# Patient Record
Sex: FEMALE | Race: BLACK OR AFRICAN AMERICAN
[De-identification: names, ages, dates, MRNs, and addresses within clinical notes are randomized per-mention and may not be internally consistent; named-entity substitution may affect disease eponyms.]

---

## 2017-04-07 ENCOUNTER — HOSPITAL ENCOUNTER (EMERGENCY)
Dept: HOSPITAL 63 - ER | Age: 42
Discharge: HOME | End: 2017-04-07
Payer: SELF-PAY

## 2017-04-07 VITALS — HEIGHT: 69 IN | WEIGHT: 188 LBS | BODY MASS INDEX: 27.85 KG/M2

## 2017-04-07 VITALS — SYSTOLIC BLOOD PRESSURE: 146 MMHG | DIASTOLIC BLOOD PRESSURE: 106 MMHG

## 2017-04-07 DIAGNOSIS — Z88.1: ICD-10-CM

## 2017-04-07 DIAGNOSIS — F41.9: ICD-10-CM

## 2017-04-07 DIAGNOSIS — E87.6: ICD-10-CM

## 2017-04-07 DIAGNOSIS — D64.9: ICD-10-CM

## 2017-04-07 DIAGNOSIS — D72.820: ICD-10-CM

## 2017-04-07 DIAGNOSIS — R42: Primary | ICD-10-CM

## 2017-04-07 DIAGNOSIS — R79.1: ICD-10-CM

## 2017-04-07 DIAGNOSIS — E86.0: ICD-10-CM

## 2017-04-07 LAB
ALBUMIN SERPL-MCNC: 3.8 G/DL (ref 3.4–5)
ALBUMIN/GLOB SERPL: 1.2 {RATIO} (ref 1–1.7)
ALP SERPL-CCNC: 42 U/L (ref 46–116)
ALT SERPL-CCNC: 25 U/L (ref 14–59)
AMORPH SED URNS QL MICRO: PRESENT /HPF
ANION GAP SERPL CALC-SCNC: 11 MMOL/L (ref 6–14)
APTT PPP: YELLOW S
AST SERPL-CCNC: 15 U/L (ref 15–37)
BACTERIA #/AREA URNS HPF: (no result) /HPF
BASOPHILS # BLD AUTO: 0 X10^3/UL (ref 0–0.2)
BASOPHILS NFR BLD: 1 % (ref 0–3)
BILIRUB SERPL-MCNC: 2.2 MG/DL (ref 0.2–1)
BILIRUB UR QL STRIP: (no result)
BUN/CREAT SERPL: 14 (ref 6–20)
CA-I SERPL ISE-MCNC: 11 MG/DL (ref 7–20)
CALCIUM SERPL-MCNC: 8.3 MG/DL (ref 8.5–10.1)
CHLORIDE SERPL-SCNC: 104 MMOL/L (ref 98–107)
CO2 SERPL-SCNC: 27 MMOL/L (ref 21–32)
CREAT SERPL-MCNC: 0.8 MG/DL (ref 0.6–1)
EOSINOPHIL NFR BLD: 0.1 X10^3/UL (ref 0–0.7)
EOSINOPHIL NFR BLD: 3 % (ref 0–3)
ERYTHROCYTE [DISTWIDTH] IN BLOOD BY AUTOMATED COUNT: 14.4 % (ref 11.5–14.5)
FIBRINOGEN PPP-MCNC: CLEAR MG/DL
GFR SERPLBLD BASED ON 1.73 SQ M-ARVRAT: 77.9 ML/MIN
GLOBULIN SER-MCNC: 3.3 G/DL (ref 2.2–3.8)
GLUCOSE SERPL-MCNC: 96 MG/DL (ref 70–99)
GLUCOSE UR STRIP-MCNC: (no result) MG/DL
HCT VFR BLD CALC: 35.2 % (ref 36–47)
HGB BLD-MCNC: 11.6 G/DL (ref 12–15.5)
LYMPHOCYTES # BLD: 1.9 X10^3/UL (ref 1–4.8)
LYMPHOCYTES NFR BLD AUTO: 52 % (ref 24–48)
MCH RBC QN AUTO: 27 PG (ref 25–35)
MCHC RBC AUTO-ENTMCNC: 33 G/DL (ref 31–37)
MCV RBC AUTO: 82 FL (ref 79–100)
MONO #: 0.3 X10^3/UL (ref 0–1.1)
MONOCYTES NFR BLD: 8 % (ref 0–9)
NEUT #: 1.3 X10^3UL (ref 1.8–7.7)
NEUTROPHILS NFR BLD AUTO: 36 % (ref 31–73)
NITRITE UR QL STRIP: (no result)
PLATELET # BLD AUTO: 218 X10^3/UL (ref 140–400)
POTASSIUM SERPL-SCNC: 3.1 MMOL/L (ref 3.5–5.1)
PROT SERPL-MCNC: 7.1 G/DL (ref 6.4–8.2)
RBC # BLD AUTO: 4.3 X10^6/UL (ref 3.5–5.4)
RBC #/AREA URNS HPF: (no result) /HPF (ref 0–2)
SODIUM SERPL-SCNC: 142 MMOL/L (ref 136–145)
SP GR UR STRIP: 1.02
SQUAMOUS #/AREA URNS LPF: (no result) /LPF
UROBILINOGEN UR-MCNC: 1 MG/DL
WBC # BLD AUTO: 3.8 X10^3/UL (ref 4–11)
WBC #/AREA URNS HPF: (no result) /HPF (ref 0–4)

## 2017-04-07 PROCEDURE — 71275 CT ANGIOGRAPHY CHEST: CPT

## 2017-04-07 PROCEDURE — 85379 FIBRIN DEGRADATION QUANT: CPT

## 2017-04-07 PROCEDURE — 36415 COLL VENOUS BLD VENIPUNCTURE: CPT

## 2017-04-07 PROCEDURE — 85730 THROMBOPLASTIN TIME PARTIAL: CPT

## 2017-04-07 PROCEDURE — 99285 EMERGENCY DEPT VISIT HI MDM: CPT

## 2017-04-07 PROCEDURE — 93005 ELECTROCARDIOGRAM TRACING: CPT

## 2017-04-07 PROCEDURE — 96360 HYDRATION IV INFUSION INIT: CPT

## 2017-04-07 PROCEDURE — 84484 ASSAY OF TROPONIN QUANT: CPT

## 2017-04-07 PROCEDURE — 81001 URINALYSIS AUTO W/SCOPE: CPT

## 2017-04-07 PROCEDURE — 71010: CPT

## 2017-04-07 PROCEDURE — 96372 THER/PROPH/DIAG INJ SC/IM: CPT

## 2017-04-07 PROCEDURE — 85610 PROTHROMBIN TIME: CPT

## 2017-04-07 PROCEDURE — 85027 COMPLETE CBC AUTOMATED: CPT

## 2017-04-07 PROCEDURE — 80053 COMPREHEN METABOLIC PANEL: CPT

## 2017-04-07 NOTE — RAD
PROCEDURE 

CTA chest with contrast 

HISTORY 

Dizziness dyspnea and elevated D-dimer 2 

TECHNIQUE 

Axial helical images of the chest obtained after administration of 75 cc 

IV Omni 300 contrast. Timing is appropriate for pulmonary artery 

evaluation. In addition thin cut axial images coronal and sagittal maximum

intensity 3D projected imaging was performed.. 

FINDINGS 

The pulmonary vessels are well opacified without filling defects. The 

thoracic aorta appears normal. There is no lymphadenopathy. 

The lungs are clear. 

IMPRESSION 

No acute findings. 

No evidence of pulmonary embolism. 

Normal thoracic aorta. 

PQRS Statement: 

One or more of the following individualized dose reduction techniques were

utilized for this study: 

1. Automated exposure control. 

2. Adjustment of the mA and/or kV according to patient size. 

3. Use of iterative reconstruction technique. 

 

 

Electronically signed by: Aleksey Houston MD (Apr 07, 2017 06:24:49)

## 2017-04-07 NOTE — ED.ADGEN
Adult General


Chief Complaint


Chief Complaint


" I woke up feeling really dizzy in the bed....".."  I do get anxiety..." but 

... I have not been dizzy for years...."  " I have felt like I am coming down 

with a bug.. for flu.. randall..."





HPI


HPI





Patient is a 42 year old female who presents with above history and complaints.

  Patient denies any travel or ill contacts.  Patient is normally healthy. 

Patient does have a past history of anemia. Patient has a history of anxiety 

disorder.  No history of cardiac disorders. No history of DVTs or pulmonary 

embolisms. No history of TIAs or CVAs.





Review of Systems


Review of Systems





Constitutional: Denies fever or chills []


Eyes: Denies change in visual acuity, redness, or eye pain []


HENT: Denies nasal congestion or sore throat []


Respiratory: Denies cough or shortness of breath []


Cardiovascular: No additional information not addressed in HPI []


GI: Denies abdominal pain, nausea, vomiting, bloody stools or diarrhea []


: Denies dysuria or hematuria []


Musculoskeletal: Denies back pain or joint pain []


Integument: Denies rash or skin lesions []


Neurologic: Denies headache, focal weakness or sensory changes [] complaints of 

dizziness


Endocrine: Denies polyuria or polydipsia []





Family History


Family History


Noncontributory





Current Medications


Current Medications





 Current Medications








 Medications


  (Trade)  Dose


 Ordered  Sig/Scarlett  Start Time


 Stop Time Status Last Admin


Dose Admin


 


 Aspirin


  (Adam Aspirin)  325 mg  1X  ONCE  4/7/17 06:00


 4/7/17 06:01 DC 4/7/17 06:00


325 MG


 


 Enoxaparin Sodium


  (Lovenox 100mg


 Syringe)  90 mg  1X  ONCE  4/7/17 06:00


 4/7/17 06:01 DC 4/7/17 06:00


90 MG


 


 Info


  (Do NOT chart on


 this entry -- for


 MONITORING)  1 each  PRN DAILY  PRN  4/7/17 06:00


 4/9/17 05:59   


 


 


 Iohexol


  (Omnipaque 300


 Mg/ml)  75 ml  1X  ONCE  4/7/17 06:00


 4/7/17 06:01 DC 4/7/17 06:05


75 ML


 


 Lactated Ringer's


  (Iv Lactated


 Ringers)  1,000 ml @ 


 1,000 mls/hr  1X  ONCE  4/7/17 05:30


 4/7/17 06:29 DC 4/7/17 05:24


1,000 MLS/HR


 


 Potassium Chloride


  (KCl Oral Soln)  40 meq  1X  ONCE  4/7/17 06:00


 4/7/17 06:01 DC 4/7/17 06:00


40 MEQ





See nursing for home meds





Allergies


Allergies





 Allergies








Coded Allergies Type Severity Reaction Last Updated Verified


 


  ceftriaxone Allergy Unknown  4/7/17 Yes











Physical Exam


Physical Exam





Constitutional: Well developed, well nourished, no acute distress, non-toxic 

appearance. []


HENT: Normocephalic, atraumatic, bilateral external ears normal, oropharynx 

moist, no oral exudates, nose normal. []


Eyes: PERRLA, EOMI, conjunctiva pale, no discharge. [] 


Neck: Normal range of motion, no tenderness, supple, no stridor. [] 


Cardiovascular:Heart rate regular rhythm, no murmur []


Lungs & Thorax:  Bilateral breath sounds equal at apexes on auscultation []


Abdomen: Bowel sounds normal, soft, no tenderness, no masses, no pulsatile 

masses. [] 


Skin: Warm, dry, no erythema, no rash. [] 


Back: No tenderness, no CVA tenderness. [] 


Extremities: No tenderness, no cyanosis, no clubbing, ROM intact, no edema. No 

cording in legs or edema.


Neurologic: Alert and oriented X 3, normal motor function, normal sensory 

function, no focal deficits noted. [] DTRs are +2 at patella and brachial. No 

drift.  equal. No Romberg.


Psychologic: Affect anxious, judgement normal, mood normal. []





Current Patient Data


Vital Signs





 Vital Signs








  Date Time  Temp Pulse Resp B/P Pulse Ox O2 Delivery O2 Flow Rate FiO2


 


4/7/17 04:18 97.6 64 16  100 Room Air  








Lab Results





 Laboratory Tests








Test


  4/7/17


04:55 4/7/17


05:03


 


White Blood Count


  3.8x10^3/uL


(4.0-11.0)  L 


 


 


Red Blood Count


  4.30x10^6/uL


(3.50-5.40) 


 


 


Hemoglobin


  11.6g/dL


(12.0-15.5)  L 


 


 


Hematocrit


  35.2%


(36.0-47.0)  L 


 


 


Mean Corpuscular Volume 82fL ()   


 


Mean Corpuscular Hemoglobin 27pg (25-35)   


 


Mean Corpuscular Hemoglobin


Concent 33g/dL (31-37)


  


 


 


Red Cell Distribution Width


  14.4%


(11.5-14.5) 


 


 


Platelet Count


  218x10^3/uL


(140-400) 


 


 


Neutrophils (%) (Auto) 36% (31-73)   


 


Lymphocytes (%) (Auto) 52% (24-48)  H 


 


Monocytes (%) (Auto) 8% (0-9)   


 


Eosinophils (%) (Auto) 3% (0-3)   


 


Basophils (%) (Auto) 1% (0-3)   


 


Neutrophils # (Auto)


  1.3x10^3uL


(1.8-7.7)  L 


 


 


Lymphocytes # (Auto)


  1.9x10^3/uL


(1.0-4.8) 


 


 


Monocytes # (Auto)


  0.3x10^3/uL


(0.0-1.1) 


 


 


Eosinophils # (Auto)


  0.1x10^3/uL


(0.0-0.7) 


 


 


Basophils # (Auto)


  0.0x10^3/uL


(0.0-0.2) 


 


 


Prothrombin Time


  10.8SEC


(9.4-11.4) 


 


 


Prothrombin Time INR 1.1 (0.9-1.1)   


 


PTT 23SEC (23-33)   


 


D-Dimer (Keysha)


  0.98mg/L


(0.00-0.50)  H 


 


 


Sodium Level


  142mmol/L


(136-145) 


 


 


Potassium Level


  3.1mmol/L


(3.5-5.1)  L 


 


 


Chloride Level


  104mmol/L


() 


 


 


Carbon Dioxide Level


  27mmol/L


(21-32) 


 


 


Anion Gap 11 (6-14)   


 


Blood Urea Nitrogen


  11mg/dL (7-20)


  


 


 


Creatinine


  0.8mg/dL


(0.6-1.0) 


 


 


Estimated GFR


(Cockcroft-Gault) 77.9  


  


 


 


BUN/Creatinine Ratio 14 (6-20)   


 


Glucose Level


  96mg/dL


(70-99) 


 


 


Calcium Level


  8.3mg/dL


(8.5-10.1)  L 


 


 


Total Bilirubin


  2.2mg/dL


(0.2-1.0)  H 


 


 


Aspartate Amino Transferase


(AST) 15U/L (15-37)  


  


 


 


Alanine Aminotransferase (ALT) 25U/L (14-59)   


 


Alkaline Phosphatase


  42U/L ()


L 


 


 


Troponin I Quantitative


  < 0.017ng/mL


(0-0.055) 


 


 


Total Protein


  7.1g/dL


(6.4-8.2) 


 


 


Albumin


  3.8g/dL


(3.4-5.0) 


 


 


Albumin/Globulin Ratio 1.2 (1.0-1.7)   


 


Urine Collection Type  Unknown  


 


Urine Color  Yellow  


 


Urine Clarity  Clear  


 


Urine pH  7.5  


 


Urine Specific Gravity  1.020  


 


Urine Protein


  


  Neg


(NEG-TRACE)


 


Urine Glucose (UA)


  


  Negmg/dL (NEG)


 


 


Urine Ketones (Stick)


  


  Negmg/dL (NEG)


 


 


Urine Blood  Mod (NEG)  


 


Urine Nitrite  Neg (NEG)  


 


Urine Bilirubin  Neg (NEG)  


 


Urine Urobilinogen Dipstick


  


  1mg/dL (0.2


mg/dL)


 


Urine Leukocyte Esterase  Neg (NEG)  


 


Urine RBC  3-5/HPF (0-2)  


 


Urine WBC


  


  Rare/HPF (0-4)


 


 


Urine Squamous Epithelial


Cells 


  Few/LPF  


 


 


Urine Amorphous Sediment  Present/HPF  


 


Urine Bacteria


  


  Few/HPF


(0-FEW)











EKG


EKG


My interpretation EKG shows a sinus rhythm at 64. No findings acute STEMI. Some 

nonspecific left axis changes. Does have an occasional premature atrial 

contraction. []





Radiology/Procedures


Radiology/Procedures


My interpretation chest x-ray shows no acute cardiopulmonary findings. My 

interpretation of CT chest shows no findings of  pulmonary embolism. []





Course & Med Decision Making


Course & Med Decision Making


Pertinent Labs and Imaging studies reviewed. (See chart for details).  Reviewed 

findings with patient. Patient declined admission at this time. Will follow-up 

primary care. Push fluids. Push vitamin C drinks. Push fruit juices. Follow-up 

anemia with primary care. Follow-up elevated lymphocyte count with primary 

care. Return if any concerns. Take a daily aspirin.





[]





Final Impression


Final Impression


1. Dizzy[]


2. Hypokalemia


3. Dehydration


4. Anemia normocytic


5. Elevated lymphocyte count


6. Elevation in d-dimer


7. History of anxiety


Problems:  





Dragon Disclaimer


Dragon Disclaimer


This electronic medical record was generated, in whole or in part, using a 

voice recognition dictation system.








NORBERT SHANNON MD Apr 7, 2017 05:17

## 2017-04-07 NOTE — RAD
Exam:  AP portable chest. 



History:  Dizziness.



Comparison:  4/20/2011.



Findings:  The heart and mediastinal structures are within normal limits for

size.  Lungs are without infiltrate.  No pneumothorax or pleural effusion is

appreciated.     



Impression:  

1.  No acute cardiopulmonary process.

## 2017-04-07 NOTE — EKG
[facility] [address], [place] [address]

Test Date:    2017               Test Time:    04:36:08

Pat Name:     WOODY BASURTO              Department:   

Patient ID:   [id number]           Room:          

Gender:       F                        Technician:   JORGE

:          1975               Requested By: NORBERT SHANNON

Order Number: [id number]            Reading MD:     

                                 Measurements

Intervals                              Axis          

Rate:         64                       P:            43

WI:           144                      QRS:          0

QRSD:         82                       T:            -7

QT:           436                                    

QTc:          454                                    

                           Interpretive Statements

SINUS RHYTHM

INTERPOLATED ATRIAL PREMATURE COMPLEX(ES)

LEFTWARD AXIS

OTHERWISE NORMAL ECG

RI6.01          Unconfirmed report

No previous ECG available for comparison

## 2018-12-14 ENCOUNTER — HOSPITAL ENCOUNTER (EMERGENCY)
Dept: HOSPITAL 63 - ER | Age: 43
Discharge: HOME | End: 2018-12-14
Payer: COMMERCIAL

## 2018-12-14 VITALS — HEIGHT: 70 IN | WEIGHT: 220 LBS | BODY MASS INDEX: 31.5 KG/M2

## 2018-12-14 VITALS
SYSTOLIC BLOOD PRESSURE: 153 MMHG | SYSTOLIC BLOOD PRESSURE: 153 MMHG | DIASTOLIC BLOOD PRESSURE: 95 MMHG | DIASTOLIC BLOOD PRESSURE: 95 MMHG

## 2018-12-14 DIAGNOSIS — M54.5: ICD-10-CM
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
## 2018-12-14 DIAGNOSIS — Z88.8: ICD-10-CM

## 2018-12-14 DIAGNOSIS — G89.11: ICD-10-CM

## 2018-12-14 DIAGNOSIS — I10: ICD-10-CM

## 2018-12-14 DIAGNOSIS — M54.2: Primary | ICD-10-CM
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
## 2018-12-14 PROCEDURE — 99283 EMERGENCY DEPT VISIT LOW MDM: CPT

## 2018-12-14 PROCEDURE — 72040 X-RAY EXAM NECK SPINE 2-3 VW: CPT
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
## 2021-12-21 ENCOUNTER — HOSPITAL ENCOUNTER (OUTPATIENT)
Dept: HOSPITAL 63 - RAD | Age: 46
End: 2021-12-21
Attending: NURSE PRACTITIONER
Payer: COMMERCIAL

## 2021-12-21 DIAGNOSIS — R10.9: Primary | ICD-10-CM

## 2021-12-21 PROCEDURE — 74018 RADEX ABDOMEN 1 VIEW: CPT

## 2021-12-21 NOTE — RAD
EXAM: Abdomen, single view.



HISTORY: Pain.



COMPARISON: None.



FINDINGS: Frontal views of the abdomen are obtained. There is a small amount of gas and stool within 
the colon. There is no evidence of bowel obstruction. There is no free air. There are pelvic phleboli
ths.



IMPRESSION: Nonobstructive bowel gas pattern.



Electronically signed by: Purnima Gonzalez MD (12/21/2021 2:26 PM) [id number]

## 2021-12-22 ENCOUNTER — HOSPITAL ENCOUNTER (EMERGENCY)
Dept: HOSPITAL 63 - ER | Age: 46
Discharge: ANOTHER HOSPITAL | End: 2021-12-22
Payer: COMMERCIAL

## 2021-12-22 VITALS — WEIGHT: 194.23 LBS | HEIGHT: 69 IN | BODY MASS INDEX: 28.77 KG/M2

## 2021-12-22 VITALS — SYSTOLIC BLOOD PRESSURE: 125 MMHG | DIASTOLIC BLOOD PRESSURE: 80 MMHG

## 2021-12-22 DIAGNOSIS — Z20.822: ICD-10-CM

## 2021-12-22 DIAGNOSIS — I10: ICD-10-CM

## 2021-12-22 DIAGNOSIS — K35.80: Primary | ICD-10-CM

## 2021-12-22 DIAGNOSIS — Z88.1: ICD-10-CM

## 2021-12-22 LAB
ALBUMIN SERPL-MCNC: 3.8 G/DL (ref 3.4–5)
ALBUMIN/GLOB SERPL: 1 {RATIO} (ref 1–1.7)
ALP SERPL-CCNC: 64 U/L (ref 46–116)
ALT SERPL-CCNC: 23 U/L (ref 14–59)
ANION GAP SERPL CALC-SCNC: 11 MMOL/L (ref 6–14)
APTT PPP: YELLOW S
AST SERPL-CCNC: 20 U/L (ref 15–37)
BACTERIA #/AREA URNS HPF: (no result) /HPF
BASOPHILS # BLD AUTO: 0 X10^3/UL (ref 0–0.2)
BASOPHILS NFR BLD: 1 % (ref 0–3)
BILIRUB SERPL-MCNC: 1.9 MG/DL (ref 0.2–1)
BILIRUB UR QL STRIP: (no result)
BUN/CREAT SERPL: 11 (ref 6–20)
CA-I SERPL ISE-MCNC: 9 MG/DL (ref 7–20)
CALCIUM SERPL-MCNC: 9.4 MG/DL (ref 8.5–10.1)
CHLORIDE SERPL-SCNC: 103 MMOL/L (ref 98–107)
CO2 SERPL-SCNC: 27 MMOL/L (ref 21–32)
CREAT SERPL-MCNC: 0.8 MG/DL (ref 0.6–1)
CRP SERPL-MCNC: 48.8 MG/L (ref 0–3.3)
EOSINOPHIL NFR BLD: 0.1 X10^3/UL (ref 0–0.7)
EOSINOPHIL NFR BLD: 1 % (ref 0–3)
ERYTHROCYTE [DISTWIDTH] IN BLOOD BY AUTOMATED COUNT: 15.1 % (ref 11.5–14.5)
FIBRINOGEN PPP-MCNC: (no result) MG/DL
GFR SERPLBLD BASED ON 1.73 SQ M-ARVRAT: 92.6 ML/MIN
GLOBULIN SER-MCNC: 4 G/DL (ref 2.2–3.8)
GLUCOSE SERPL-MCNC: 89 MG/DL (ref 70–99)
GLUCOSE UR STRIP-MCNC: (no result) MG/DL
HCT VFR BLD CALC: 35.6 % (ref 36–47)
HGB BLD-MCNC: 11.6 G/DL (ref 12–15.5)
LIPASE: 75 U/L (ref 73–393)
LYMPHOCYTES # BLD: 1.3 X10^3/UL (ref 1–4.8)
LYMPHOCYTES NFR BLD AUTO: 23 % (ref 24–48)
MCH RBC QN AUTO: 27 PG (ref 25–35)
MCHC RBC AUTO-ENTMCNC: 33 G/DL (ref 31–37)
MCV RBC AUTO: 81 FL (ref 79–100)
MONO #: 0.6 X10^3/UL (ref 0–1.1)
MONOCYTES NFR BLD: 11 % (ref 0–9)
NEUT #: 3.4 X10^3UL (ref 1.8–7.7)
NEUTROPHILS NFR BLD AUTO: 63 % (ref 31–73)
NITRITE UR QL STRIP: (no result)
PLATELET # BLD AUTO: 311 X10^3/UL (ref 140–400)
POTASSIUM SERPL-SCNC: 3.2 MMOL/L (ref 3.5–5.1)
PROT SERPL-MCNC: 7.8 G/DL (ref 6.4–8.2)
RBC # BLD AUTO: 4.39 X10^6/UL (ref 3.5–5.4)
RBC #/AREA URNS HPF: (no result) /HPF (ref 0–2)
SODIUM SERPL-SCNC: 141 MMOL/L (ref 136–145)
SP GR UR STRIP: 1.01
SQUAMOUS #/AREA URNS LPF: (no result) /LPF
UROBILINOGEN UR-MCNC: 1 MG/DL
WBC # BLD AUTO: 5.4 X10^3/UL (ref 4–11)
WBC #/AREA URNS HPF: (no result) /HPF (ref 0–4)

## 2021-12-22 PROCEDURE — 87086 URINE CULTURE/COLONY COUNT: CPT

## 2021-12-22 PROCEDURE — U0003 INFECTIOUS AGENT DETECTION BY NUCLEIC ACID (DNA OR RNA); SEVERE ACUTE RESPIRATORY SYNDROME CORONAVIRUS 2 (SARS-COV-2) (CORONAVIRUS DISEASE [COVID-19]), AMPLIFIED PROBE TECHNIQUE, MAKING USE OF HIGH THROUGHPUT TECHNOLOGIES AS DESCRIBED BY CMS-2020-01-R: HCPCS

## 2021-12-22 PROCEDURE — 80053 COMPREHEN METABOLIC PANEL: CPT

## 2021-12-22 PROCEDURE — 87591 N.GONORRHOEAE DNA AMP PROB: CPT

## 2021-12-22 PROCEDURE — 81001 URINALYSIS AUTO W/SCOPE: CPT

## 2021-12-22 PROCEDURE — 99285 EMERGENCY DEPT VISIT HI MDM: CPT

## 2021-12-22 PROCEDURE — 87426 SARSCOV CORONAVIRUS AG IA: CPT

## 2021-12-22 PROCEDURE — C9803 HOPD COVID-19 SPEC COLLECT: HCPCS

## 2021-12-22 PROCEDURE — 96365 THER/PROPH/DIAG IV INF INIT: CPT

## 2021-12-22 PROCEDURE — 81025 URINE PREGNANCY TEST: CPT

## 2021-12-22 PROCEDURE — 87491 CHLMYD TRACH DNA AMP PROBE: CPT

## 2021-12-22 PROCEDURE — 86140 C-REACTIVE PROTEIN: CPT

## 2021-12-22 PROCEDURE — 74177 CT ABD & PELVIS W/CONTRAST: CPT

## 2021-12-22 PROCEDURE — 85025 COMPLETE CBC W/AUTO DIFF WBC: CPT

## 2021-12-22 PROCEDURE — 83690 ASSAY OF LIPASE: CPT

## 2021-12-22 NOTE — PHYS DOC
Past History


Past Medical History:  Hypertension


Past Surgical History:  Other


Additional Past Surgical Histo:  umbilical hernia repaired; keloid ears; fat 

transfer


Alcohol Use:  Rarely


Drug Use:  None





General Adult


EDM:


Chief Complaint:  ABDOMINAL PAIN





HPI:


HPI:





Patient is a 46-year-old female coming in for 5 days of abdominal pain.  States 

it started periumbilical area 5 days ago and was cramping, gas-like pain.  

Patient states that Pepto-Bismol recently helped his been taking Tylenol since. 

Patient has decreased appetite but the pain is not change with food.  Is worse 

with palpation and movement, at rest describes the pain as a 3 out of 10.  

Denies any change with urination, says she has occasional yellowish vaginal 

discharge.  Denies any fevers.  Has a history of umbilical hernia repair but no 

other abdominal surgeries.  She was seen by her primary care provider and had 

labs ordered an x-ray yesterday.  Has a history of hypertension takes 

amlodipine.  Last p.o. intake 30 minutes prior to arrival.  Last bowel movement 

about an hour and a half prior to evaluation, denies any diarrhea, hematochezia 

or melena.





Review of Systems:


Review of Systems:


All other systems within normal limits except for as noted in the HPI





Allergies:


Allergies:





Allergies








Coded Allergies Type Severity Reaction Last Updated Verified


 


  amoxicillin Allergy Unknown  21 Yes


 


  ceftriaxone Allergy Unknown  21 Yes











Physical Exam:


PE:


Constitutional: Well developed, well nourished, no acute distress, non-toxic 

appearance. []


HENT: Normocephalic, atraumatic, bilateral external ears normal,  nose normal. 

[]


Eyes: PERRLA, conjunctiva normal, no discharge. [] 


Neck: No rigidity, supple, no stridor. [] 


Cardiovascular: Regular rate and rhythm, brisk cap refill []


Lungs & Thorax: Non labored symmetric respirations, no tachypnea or respiratory 

distress []


Abdomen: Soft, nondistended, right lower quadrant tenderness with guarding, mild

 tenderness in right upper quadrant.


Skin: Warm, dry, no erythema, no rash. [] 


Back: Unremarkable


Extremities: No deformities, range of motion grossly intact, no lower extremity 

edema [] 


Neurologic: Alert and oriented X 3, no focal deficits noted. []


Psychologic: Affect normal, judgement normal, mood normal. []





Current Patient Data:


Vital Signs:





                                   Vital Signs








  Date Time  Temp Pulse Resp B/P (MAP) Pulse Ox O2 Delivery O2 Flow Rate FiO2


 


21 12:14 98.2 65 18 110/76 (87) 100 Room Air  











EKG:


EKG:


[]





Radiology/Procedures:


Radiology/Procedures:


[facility] [address], [place] [address]


                                 [phone]


                                        


                                 IMAGING REPORT





                                     Signed





PATIENT: WOODY BASURTO    ACCOUNT: [id number]     MRN#: [id number]


: 1975           LOCATION: ER              AGE: 46


SEX: F                    EXAM DT: 21         ACCESSION#: [id number]


STATUS: REG ER            ORD. PHYSICIAN: SARA VALENCIA MD


REASON: right abd pain CM ORDERED


PROCEDURE: CT ABD PELV W/ IV CONTRST ONLY








INDICATION: Reason: right abd pain CM ORDERED / Spl. Instructions:  / History: 





COMPARISON: None.





TECHNIQUE: 





Axial CT images were obtained through the abdomen and pelvis with intravenous 

contrast.  





One or more of the following individualized dose reduction techniques were 

utilized for this examination:  1. Automated exposure control;  2. Adjustment of

 the mA and/or kV according to patient size;  3. Use of iterative reconstruction

 technique.





FINDINGS:





Linear opacity at the left lower lung could be from scarring or atelectasis.


Vascular: Scattered calcific atherosclerosis.


Hepatobiliary: There is a couple low-density lesions within the liver with one 

of them in the right lobe likely cystic in nature and one of them less than a 

centimeter in size and too small to characterize but a common finding.


Pancreas: No peripancreatic edema.


Spleen: Spleen unremarkable.


Renal/Bladder: Urinary bladder is partially distended with some prominence of 

the wall. No hydronephrosis.


Gastrointestinal: Small amount of free fluid in the pelvis. 37 mm right adnexal 

cystic lesion. Inflammatory process at the right lower quadrant centered around 

the base of the appendix with edema to the fat as well as an area of focal fluid

 measuring approximately 23 mm. The appendix is seen at this location and is 

dilated measuring approximately 12 mm.


No dilated loops of bowel to suggest obstruction.


Degenerative changes the spine with multilevel central canal and neural 

foraminal stenosis.


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


Circular fat-containing structure within the fat lateral to the levator ani 

which could be from causes such as fat necrosis or oil cyst. Another possible 

cause would include a small lipoma.





IMPRESSION:





*   Inflammatory changes at the right lower quadrant of the abdomen centered 

around the base of the appendix with adjacent enlargement of the appendix as 

well as wall thickening of the cecum with some adjacent fluid. This could be 

secondary to appendicitis with adjacent early abscess/phlegmon formation. There 

is adjacent lymphadenopathy.





*  Right adnexal cystic lesion.





Electronically signed by: Makayla Wright MD (2021 1:54 PM) DESKTOP-

[id number]














DICTATED AND SIGNED BY:     MAKAYLA WRIGHT MD


DATE:     21





CC: SARA VALENCIA MD; PCP,NO ~MTH0 0


[]





Heart Score:


C/O Chest Pain:  No


Risk Factors:


Risk Factors:  DM, Current or recent (<one month) smoker, HTN, HLP, family 

history of CAD, obesity.


Risk Scores:


Score 0 - 3:  2.5% MACE over next 6 weeks - Discharge Home


Score 4 - 6:  20.3% MACE over next 6 weeks - Admit for Clinical Observation


Score 7 - 10:  72.7% MACE over next 6 weeks - Early Invasive Strategies





Course & Med Decision Making:


Course & Med Decision Making


Pertinent Labs and Imaging studies reviewed. (See chart for details)


Jeremiah has not had bedside discussed with patient transfer to another 

hospital system, patient requesting [facility].  Contacted the transfer 

line and patient accepted by hospitalist Dr. Casey and general surgeon Dr. Randall.  Patient was transported via EMS for evaluation in the emergency 

department and OR today


[]





Dragon Disclaimer:


Dragon Disclaimer:


This electronic medical record was generated, in whole or in part, using a voice

 recognition dictation system.





Departure


Departure:


Impression:  


   Primary Impression:  


   Appendicitis, acute


Disposition:  [facility]


Condition:  STABLE


Referrals:  


PCP,NO (PCP)











SARA VALENCIA MD            Dec 22, 2021 12:49